# Patient Record
Sex: MALE | Race: WHITE
[De-identification: names, ages, dates, MRNs, and addresses within clinical notes are randomized per-mention and may not be internally consistent; named-entity substitution may affect disease eponyms.]

---

## 2018-02-21 ENCOUNTER — HOSPITAL ENCOUNTER (OUTPATIENT)
Dept: HOSPITAL 59 - SUR | Age: 63
Discharge: HOME | End: 2018-02-21
Attending: ORTHOPAEDIC SURGERY
Payer: COMMERCIAL

## 2018-02-21 DIAGNOSIS — E11.9: ICD-10-CM

## 2018-02-21 DIAGNOSIS — E78.00: ICD-10-CM

## 2018-02-21 DIAGNOSIS — M23.222: ICD-10-CM

## 2018-02-21 DIAGNOSIS — I10: ICD-10-CM

## 2018-02-21 DIAGNOSIS — M22.42: Primary | ICD-10-CM

## 2018-02-21 DIAGNOSIS — E03.9: ICD-10-CM

## 2018-02-21 DIAGNOSIS — Z79.84: ICD-10-CM

## 2018-02-21 LAB
ANION GAP SERPL CALC-SCNC: 11 MMOL/L (ref 7–16)
BASOPHILS NFR BLD: 0.5 % (ref 0–6)
BUN SERPL-MCNC: 16 MG/DL (ref 8–23)
CO2 SERPL-SCNC: 25 MMOL/L (ref 22–29)
CREAT SERPL-MCNC: 1 MG/DL (ref 0.7–1.2)
EOSINOPHIL NFR BLD: 3 % (ref 0–6)
ERYTHROCYTE [DISTWIDTH] IN BLOOD BY AUTOMATED COUNT: 13.3 % (ref 11.5–14.5)
EST GLOMERULAR FILTRATION RATE: > 60 ML/MIN
GLUCOSE SERPL-MCNC: 109 MG/DL (ref 74–109)
GRANULOCYTES NFR BLD: 53.8 % (ref 47–80)
HCT VFR BLD CALC: 43.6 % (ref 42–52)
HGB BLD-MCNC: 14.8 GM/DL (ref 14–18)
LYMPHOCYTES NFR BLD AUTO: 30.9 % (ref 16–45)
MCH RBC QN AUTO: 31.4 PG (ref 27–33)
MCHC RBC AUTO-ENTMCNC: 33.9 G/DL (ref 32–36)
MCV RBC AUTO: 92.4 FL (ref 81–97)
MONOCYTES NFR BLD: 11.8 % (ref 0–9)
PLATELET # BLD: 267 K/UL (ref 130–400)
PMV BLD AUTO: 9.4 FL (ref 7.4–10.4)
RBC # BLD AUTO: 4.72 M/UL (ref 4.4–5.7)
WBC # BLD AUTO: 5.9 K/UL (ref 4.2–12.2)

## 2018-02-21 PROCEDURE — 01400 ANES OPN/ARTHRS KNEE JT NOS: CPT

## 2018-02-21 PROCEDURE — 85025 COMPLETE CBC W/AUTO DIFF WBC: CPT

## 2018-02-21 PROCEDURE — 80048 BASIC METABOLIC PNL TOTAL CA: CPT

## 2018-02-21 PROCEDURE — 29881 ARTHRS KNE SRG MNISECTMY M/L: CPT

## 2018-02-21 NOTE — OPERATIVE NOTE
DATE:  02/21/2018



PREOPERATIVE DIAGNOSIS:  INTERNAL DERANGEMENT LEFT KNEE. 



POSTOPERATIVE DIAGNOSES:  

1.  SMALL GRADE 3 CHONDROMALACIA PATELLA.

2.  TEAR OF THE POSTERIOR HORN OF THE MEDIAL MENISCUS.



PROCEDURE: 

1.  LEFT KNEE ARTHROSCOPY WITH PARTIAL MEDIAL MENISCECTOMY.

2.  LEFT KNEE ARTHROSCOPY WITH CHONDROPLASTY OF THE PATELLA. 



STAFF SURGEON:  FLORENCE GRAVES M.D.



ANESTHESIA:  GENERAL.



PREPARATION:  CHLORAPREP.



INDIVIDUAL CONSIDERATIONS:  NONE.



PROCEDURE:  The patient was taken to the Operating Room and placed supine on 
the operating table. He had a successful induction with general anesthetic. His 
left lower extremity was prepped and draped in the usual fashion. 



The patient had a superior lateral inflow cannula placed. The skin was 
infiltrated with 0.5% Marcaine with Epinephrine prior. The knee was then 
inflated with normal saline. An inferior medial and an inferior lateral portal 
were made in a similar fashion. The arthroscope was introduced through the 
inferior lateral portal up into the pouch. The patellofemoral compartment 
showed small grade 3 change and a large medial plica. The plica was removed. 
This was probably just incidental and the patella was smoothed. No significant 
synovitis. No loose bodies were seen in the pouch or either gutter. Medially, 
he had basically a complex split tear involving the posterior medial corner of 
the medial meniscus. This was debrided back to a stable rim with basket forceps 
and a shaver. The articular cartilage looked good. In the notch, the cruciates 
were fine and the lateral compartment structures were normal. The knee was then 
irrigated out with saline to remove loose floating debris. The portals were 
closed staples and 20 mL of 0.25% plain Marcaine along with 4 mg of Morphine 
and 40 mg of DepoMedrol were injected into the knee and a sterile Bulkee 
compressive dressing was applied. The patient tolerated the procedure well. 
Needle and sponge counts were correct. Estimated blood loss was minimal and he 
was taken back to Recovery in good condition. There were no complications.  



cc: Dr. Lola Lan



JOB NUMBER: [id number]
[facility]